# Patient Record
Sex: MALE | Race: WHITE
[De-identification: names, ages, dates, MRNs, and addresses within clinical notes are randomized per-mention and may not be internally consistent; named-entity substitution may affect disease eponyms.]

---

## 2018-02-27 ENCOUNTER — HOSPITAL ENCOUNTER (OUTPATIENT)
Dept: HOSPITAL 74 - FASU | Age: 68
Discharge: HOME | End: 2018-02-27
Attending: OPHTHALMOLOGY
Payer: COMMERCIAL

## 2018-02-27 VITALS — TEMPERATURE: 98.2 F | SYSTOLIC BLOOD PRESSURE: 131 MMHG | DIASTOLIC BLOOD PRESSURE: 80 MMHG | HEART RATE: 72 BPM

## 2018-02-27 VITALS — BODY MASS INDEX: 27.8 KG/M2

## 2018-02-27 DIAGNOSIS — C44.119: Primary | ICD-10-CM

## 2018-02-27 DIAGNOSIS — H02.89: ICD-10-CM

## 2018-02-27 PROCEDURE — 08BR0ZZ EXCISION OF LEFT LOWER EYELID, OPEN APPROACH: ICD-10-PCS | Performed by: OPHTHALMOLOGY

## 2018-02-27 PROCEDURE — 08URX7Z SUPPLEMENT LEFT LOWER EYELID WITH AUTOLOGOUS TISSUE SUBSTITUTE, EXTERNAL APPROACH: ICD-10-PCS | Performed by: OPHTHALMOLOGY

## 2018-02-28 NOTE — OP
DATE OF OPERATION:  02/27/2018

 

PREOPERATIVE DIAGNOSIS:  Extensive defect, left lower eyelid and left cheek status

post Mohs excision of carcinoma.

 

POSTOPERATIVE DIAGNOSIS:  Extensive defect, left lower eyelid and left cheek status

post Mohs excision of carcinoma.

 

PROCEDURE:

1.  Debridement and tailoring of defect, left lower lid and left cheek.

2.  Conjunctivoplasty, left inferior fornix with conjunctival flap.

3.  Dorsal conjunctival _____ from the left upper lid to the left lower lid.

4.  Full-thickness skin graft on the left postauricular region to the left lower lid.

 

SURGEON:  Tara Bahena MD

 

ANESTHESIA:  Local with sedation.

 

Medical student named Lucrecia.

 

COMPLICATIONS:  None.

 

ESTIMATED BLOOD LOSS:  5 mL

 

DESCRIPTION OF OPERATIVE PROCEDURE:  Patient brought to the operating room and placed

on the operating table.  Vital signs monitored by Anesthesia.  Tetracaine was placed

in both eyes.  The wound was photographed.  Timeout was performed.  A 50/50 mixture

of 2% Xylocaine with 1:100,000 epinephrine and 0.5% Marcaine was administered

throughout the left lower lid, as well as centrally in the left upper lid and in

eversion that is underneath the conjunctiva just above the tarsus.  The patient was

massaged for hemostasis, prepped and draped in the usual sterile fashion.  The left

ear was also injected in the pretragal and helical regions, and this was prepped as

well.  After the patient was prepped and draped, exposing both eyes and the ear, a

4-0 silk traction suture was passed through the central lid margin of the upper lid

and below.  Portion of the eyelid was debrided.  The conjunctival flap was dissected

off with retractors and allowed to be mobilized free from the lower lid to bring it

up toward the _____.  An incision was made in the upper lid with a number-11 blade,

through the medial and lateral tiny remnant of the eyelids.  An anterior and

posterior lamella there in these 2 remnants with the exposed tarsus for attachment of

the tarsus conjunctival graft.  The upper lid was everted over a Desmarres retractor.

 The 2 lateral and medial portions of the eyelid were advanced centrally and measured

and found to be 19 mm in width.  Therefore, a 19-20 mm-wide tarsal conjunctival graft

was harvested from the central left upper lid through full-thickness tarsus.  Tarsus

was flipped inferiorly and dissected off of Cramer's muscle, creating a

tarsoconjunctival flap or graft.  This  _____ was dissected up into the superior

fornix until it rested freely in the defect in the lower lid.  Tarsal conjunctival

graft in the upper lid was secured to the medial and lateral remnants of the lower

lid with interrupted 7-0 Vicryl sutures, creating the posterior lamella.  The

inferior edge of the tarsoconjunctival graft was secured to the previously freed up

conjunctival flap and inferior fornix with a running 6-0 chromic, avoiding any

attachment to _____ on the bulbar surface.  This completed the posterior lamella. 

Graft template was placed, and the graft template was then oriented on the

postauricular surface of the left ear.  The left ear helix was sutured to the

pretragal region with 2 interrupted 4-0 silk sutures, and the graft side of that was

marked.  It was then injected with 2% Xylocaine with 1:100,000 epinephrine for a

total of 3-4 mL around the graft of the ear, and then, the graft was harvested by

incising the perimeter with a 15 blade and dissecting it off with Watts scissors

and Lacy scissors.  Hemostasis was achieved with [place] needle, and the

postauricular wound was closed with running 3-0 chromic suture, reinforced with

interrupted 3-0 chromic sutures, everting the skin edges.  Helix sutures were

removed.  The graft was freed of all subcutaneous tissue and pie crusted with a

number 11 blade, and it was sutured in with plastic technique to the lower defect of

the cheek and eyelid, suturing it to the new eyelid margin with running 6-0 chromic

suture to the tarsoconjunctival graft and suturing it with 6-0 plain suture nasally

and temporally and 5-0 plain suture inferiorly with plastic technique and recreating

the anterior lamella.  Strips of Telfa and bacitracin ointment were placed on the

skin graft and the eye, and a dental roll was tied over with 4-0 silk from the cheek

to the upper eyelid with the sutures which had been previously placed, creating

gentle fixation of the suture of the dental roll over the graft.  The suture rolls

were passed through the dental roll to secure it in position.  The eyelid was closed.

 Strip Telfa and eye pads and gentle fluff were then placed over the eye and secured

there with Mastisol and paper tape.  Xeroform and gauze were placed behind the left

ear.  The patient was taken to recovery room in stable condition.

 

 

TARA BAHENA M.D.

 

[id number]

DD: 02/27/2018 14:14

DT: 02/27/2018 16:40

Job #:  [id number]

## 2018-04-03 ENCOUNTER — HOSPITAL ENCOUNTER (OUTPATIENT)
Dept: HOSPITAL 74 - FASU | Age: 68
Discharge: HOME | End: 2018-04-03
Attending: OPHTHALMOLOGY
Payer: COMMERCIAL

## 2018-04-03 VITALS — TEMPERATURE: 97.9 F

## 2018-04-03 VITALS — BODY MASS INDEX: 29.2 KG/M2

## 2018-04-03 VITALS — SYSTOLIC BLOOD PRESSURE: 99 MMHG | HEART RATE: 72 BPM | DIASTOLIC BLOOD PRESSURE: 64 MMHG

## 2018-04-03 DIAGNOSIS — H02.525: ICD-10-CM

## 2018-04-03 DIAGNOSIS — C44.119: Primary | ICD-10-CM

## 2018-04-03 PROCEDURE — 0HB1XZZ EXCISION OF FACE SKIN, EXTERNAL APPROACH: ICD-10-PCS | Performed by: OPHTHALMOLOGY

## 2018-04-03 NOTE — OP
DATE OF OPERATION:  04/03/2018

 

PREOPERATIVE DIAGNOSIS:  Ankyloblepharon, left, status post reconstruction of lower

lid for basal cell carcinoma.

 

POSTOPERATIVE DIAGNOSIS:  Ankyloblepharon, left, status post reconstruction of lower

lid for basal cell carcinoma.

 

PROCEDURE:

1.  Excision of ankyloblepharon, left upper lid.

2.  Reconstruction of the margin of the left lower lid.

 

SURGEON:  Tara Bahena MD

 

ANESTHESIA:  Local with sedation.

 

COMPLICATIONS:  None.

 

ESTIMATED BLOOD LOSS:  1-2 mL

 

DESCRIPTION OF OPERATION:  Patient brought to the operating room, placed on the

operating room table.  Vital signs were monitored by Anesthesia.  Timeout was

performed.  Tetracaine was placed in both eyes.  The site was confirmed.  A marking

pen was used to ilan the new margin of the left lower lid.  After which, intravenous

sedation was administered, and 2% Xylocaine with 1:100,000 epinephrine and 0.5%

Marcaine were injected into the body of the ankyloblepharon, into the upper lid, and

into the lower lid as well for anesthesia and to satisfy hemostasis.  Patient was

prepped and draped in the usual sterile fashion, exposing both eyes.  A 4-0 silk

traction stitch was passed through the margin of the left upper lid and clamped in a

hemostat superiorly.  The ankyloblepharon was incised with a Lacy scissor,

beveling the incision site _____ conjunctiva relative to the new margin of the skin. 

Once this was released across the eye, the anterior and middle lamella were trimmed

as needed, and the conjunctiva was sewn over the margin of the eyelid and tailored as

needed and was sewn with a running 6-0 plain suture until the margin was

reconstructed entirely.  A small amount of trimming was performed to create a nice

smooth contour.  The lid was everted over Desmarres retractor, and the

ankyloblepharon was completely removed.  The base of the blepharon was cauterized as

needed for hemostasis.  Bacitracin ointment was placed in the eye and on the sutures

of the lower lid, and the patient was taken to the recovery room in stable condition.

 

 

TARA BAHENA M.D.

 

[id number]

DD: 04/03/2018 10:55

DT: 04/03/2018 11:32

Job #:  [id number]

## 2018-04-05 NOTE — PATH
Surgical Pathology Report



Patient Name:  TOSHIA MCDANIEL

Accession #:  

Med. Rec. #:  [id number]                                                        

   /Age/Gender:  1950 (Age: 67) / M

Account:  [id number]                                                          

             Location: [facility] 

Taken:  4/3/2018

Received:  4/3/2018

Reported:  2018

Physicians:  Chris Acharya

  



Specimen(s) Received

 LEFT ANKYLOBLEPHARON 





Clinical History

Basal cell cancer left lower eyelid

Opening of graft left eye 







Final Diagnosis

ANKYLOBLEPHARON, LEFT, EXCISION:

EYELID TISSUE SHOWING CHRONIC INFLAMMATION, GRANULATION TISSUE AND DENSE

FIBROSIS.





***Electronically Signed***

Estrellita Watson M.D.





Gross Description

Received in formalin labeled "left ankyloblepharon," is a 1.0 x 0.3 x 0.3 cm

portion of tan tissue. The base is inked blue and the specimen is sectioned and

entirely submitted in 2 cassettes as follows: 1-undesignated tips; 2-central

portion of specimen.

## 2025-02-11 ENCOUNTER — OFFICE (OUTPATIENT)
Dept: URBAN - METROPOLITAN AREA CLINIC 86 | Facility: CLINIC | Age: 75
Setting detail: OPHTHALMOLOGY
End: 2025-02-11
Payer: MEDICARE

## 2025-02-11 DIAGNOSIS — E11.3391: ICD-10-CM

## 2025-02-11 DIAGNOSIS — H52.4: ICD-10-CM

## 2025-02-11 DIAGNOSIS — H02.825: ICD-10-CM

## 2025-02-11 DIAGNOSIS — E11.3292: ICD-10-CM

## 2025-02-11 DIAGNOSIS — H11.043: ICD-10-CM

## 2025-02-11 DIAGNOSIS — H02.015: ICD-10-CM

## 2025-02-11 PROCEDURE — 92015 DETERMINE REFRACTIVE STATE: CPT | Performed by: OPHTHALMOLOGY

## 2025-02-11 PROCEDURE — 92014 COMPRE OPH EXAM EST PT 1/>: CPT | Performed by: OPHTHALMOLOGY

## 2025-02-11 PROCEDURE — 92134 CPTRZ OPH DX IMG PST SGM RTA: CPT | Performed by: OPHTHALMOLOGY

## 2025-02-11 ASSESSMENT — REFRACTION_MANIFEST
OS_SPHERE: +0.25
OD_SPHERE: +0.75
OS_ADD: +2.50
OD_VA2: 20/20(J1+)
OS_AXIS: 165
OS_VA2: 20/20(J1+)
OD_VA1: 20/25
OS_VA1: 20/25
OU_VA: 20/20
OS_CYLINDER: +1.25
OD_AXIS: 05
OD_ADD: +2.50
OD_CYLINDER: +1.25

## 2025-02-11 ASSESSMENT — CORNEAL PTERYGIUM
OD_PTERYGIUM: NASAL 1MM
OS_PTERYGIUM: NASAL 1MM

## 2025-02-11 ASSESSMENT — REFRACTION_CURRENTRX
OS_ADD: +2.50
OD_VPRISM_DIRECTION: PROGS
OS_OVR_VA: 20/
OD_SPHERE: +1.00
OS_SPHERE: +0.25
OD_CYLINDER: SPH
OD_ADD: +2.50
OD_OVR_VA: 20/
OS_VPRISM_DIRECTION: PROGS
OS_CYLINDER: SPH

## 2025-02-11 ASSESSMENT — REFRACTION_AUTOREFRACTION
OD_CYLINDER: +1.25
OS_AXIS: 164
OD_AXIS: 03
OS_CYLINDER: +1.25
OD_SPHERE: +0.75
OS_SPHERE: -0.25

## 2025-02-11 ASSESSMENT — LID EXAM ASSESSMENTS: OS_TRICHIASIS: LLL T

## 2025-02-11 ASSESSMENT — VISUAL ACUITY
OS_BCVA: 20/30
OD_BCVA: 20/30

## 2025-02-11 ASSESSMENT — CONFRONTATIONAL VISUAL FIELD TEST (CVF)
OD_FINDINGS: FULL
OS_FINDINGS: FULL

## 2025-02-11 ASSESSMENT — TONOMETRY
OS_IOP_MMHG: 14
OD_IOP_MMHG: 14